# Patient Record
Sex: FEMALE | Race: WHITE | NOT HISPANIC OR LATINO | URBAN - METROPOLITAN AREA
[De-identification: names, ages, dates, MRNs, and addresses within clinical notes are randomized per-mention and may not be internally consistent; named-entity substitution may affect disease eponyms.]

---

## 2021-07-08 LAB
ABS NEUT (OLG): 12.5 X10(3)/MCL (ref 2.1–9.2)
BASOPHILS # BLD AUTO: 0 X10(3)/MCL (ref 0–0.2)
BASOPHILS NFR BLD AUTO: 0 %
EOSINOPHIL # BLD AUTO: 0.1 X10(3)/MCL (ref 0–0.9)
EOSINOPHIL NFR BLD AUTO: 0 %
ERYTHROCYTE [DISTWIDTH] IN BLOOD BY AUTOMATED COUNT: 12.2 % (ref 11.5–17)
HCT VFR BLD AUTO: 46.7 % (ref 37–47)
HGB BLD-MCNC: 16.3 GM/DL (ref 12–16)
LYMPHOCYTES # BLD AUTO: 3 X10(3)/MCL (ref 0.6–4.6)
LYMPHOCYTES NFR BLD AUTO: 18 %
MCH RBC QN AUTO: 28.8 PG (ref 27–31)
MCHC RBC AUTO-ENTMCNC: 34.9 GM/DL (ref 33–36)
MCV RBC AUTO: 82.7 FL (ref 80–94)
MONOCYTES # BLD AUTO: 0.7 X10(3)/MCL (ref 0.1–1.3)
MONOCYTES NFR BLD AUTO: 4 %
NEUTROPHILS # BLD AUTO: 12.5 X10(3)/MCL (ref 2.1–9.2)
NEUTROPHILS NFR BLD AUTO: 76 %
PLATELET # BLD AUTO: 342 X10(3)/MCL (ref 130–400)
PMV BLD AUTO: 12.7 FL (ref 9.4–12.4)
RBC # BLD AUTO: 5.65 X10(6)/MCL (ref 4.2–5.4)
SARS-COV-2 AG RESP QL IA.RAPID: NEGATIVE
WBC # SPEC AUTO: 16.4 X10(3)/MCL (ref 4.5–11.5)

## 2021-07-09 ENCOUNTER — HISTORICAL (OUTPATIENT)
Dept: SURGERY | Facility: HOSPITAL | Age: 26
End: 2021-07-09

## 2021-07-09 LAB — PRODUCT READY: NORMAL

## 2022-04-30 NOTE — OP NOTE
Patient:   Nupur Taylor             MRN: [id number]            FIN: [id number]               Age:   26 years     Sex:  Female     :  1995   Associated Diagnoses:   None   Author:   Madalyn Vivas MD      Preop Diagnosis: Missed     Postop Diagnosis: Same    Procedure: Suction D&C    Surgeon: Madalyn Vivas M.D.    Anesthesia: General     IVF: 800 mL     EBL: 200 mL    Urine output: 100 mL    Specimen:Products of conception    Complications:None    Findings:  Exam revealed 8 week size mobile uterus, small closed cervix.      Procedure: The patient was taken to the OR, general anesthesia was obtained without difficulty.  She was placed in the dorsal lithotomy position with [place] stirrups.  Exam under anesthesia revealed the above mentioned findings.  She was then prepped and draped in the normal sterile fashion.  A speculum was placed in the vagina.  A single tooth tenaculum was used to grasp the anterior lip of the cervix. Hegar dilators were used to sequentially dilate the cervix to accommodate an 8mm suction curet. Suction curet was advanced to fundus and suction activated with POC noted in tubing. The uterus was curetted in a clockwise fashion until a gritty feeling was noted and suction curettage was again performed. The specimen was sent to pathology.  The tenaculum was removed from the cervix and the puncture site was noted to be hemostatic.  The internal os  was then noted to be closed.  No bleeding was noted. ultrasound confirmed thin endometrial stripe. The patient tolerated the procedure well.  All instrument and sponge counts were correct times two.  The patient was awakened from general anesthesia and taken to the recovery room in stable condition.